# Patient Record
Sex: MALE | Race: WHITE | Employment: FULL TIME | URBAN - METROPOLITAN AREA
[De-identification: names, ages, dates, MRNs, and addresses within clinical notes are randomized per-mention and may not be internally consistent; named-entity substitution may affect disease eponyms.]

---

## 2019-08-01 ENCOUNTER — OFFICE VISIT (OUTPATIENT)
Dept: FAMILY MEDICINE CLINIC | Age: 29
End: 2019-08-01

## 2019-08-01 VITALS
OXYGEN SATURATION: 100 % | HEIGHT: 66 IN | BODY MASS INDEX: 29.25 KG/M2 | RESPIRATION RATE: 17 BRPM | TEMPERATURE: 97.9 F | DIASTOLIC BLOOD PRESSURE: 77 MMHG | HEART RATE: 80 BPM | SYSTOLIC BLOOD PRESSURE: 124 MMHG | WEIGHT: 182 LBS

## 2019-08-01 DIAGNOSIS — R03.0 ELEVATED BLOOD PRESSURE READING: Primary | ICD-10-CM

## 2019-08-01 NOTE — PROGRESS NOTES
Chief Complaint   Patient presents with    Establish Care    Hypertension     Home readings 150/100, 140/90    Labs     NON Fasting     1. Have you been to the ER, urgent care clinic since your last visit? Hospitalized since your last visit? No    2. Have you seen or consulted any other health care providers outside of the [address] [place] [address] since your last visit? Include any pap smears or colon screening. No      Chief Complaint   Patient presents with    Establish Care    Hypertension     Home readings 150/100, 140/90    Labs     NON Fasting     He is a 30 y.o. male who presents for evalution. Reviewed PmHx, RxHx, FmHx, SocHx, AllgHx and updated and dated in the chart. There are no active problems to display for this patient. Review of Systems - negative except as listed above in the HPI    Objective:     Vitals:    08/01/19 0956   BP: 124/77   Pulse: 80   Resp: 17   Temp: 97.9 °F (36.6 °C)   TempSrc: Oral   SpO2: 100%   Weight: 182 lb (82.6 kg)   Height: 5' 6.25\" (1.683 m)     Physical Examination: General appearance - alert, well appearing, and in no distress  Neck - supple, no significant adenopathy  Chest - clear to auscultation, no wheezes, rales or rhonchi, symmetric air entry  Heart - normal rate, regular rhythm, normal S1, S2, no murmurs, rubs, clicks or gallops    Assessment/ Plan:   Diagnoses and all orders for this visit:    1. Elevated blood pressure reading  -nl here  -cont watch at home           I have discussed the diagnosis with the patient and the intended plan as seen in the above orders. The patient understands and agrees with the plan. The patient has received an after-visit summary and questions were answered concerning future plans. Medication Side Effects and Warnings were discussed with patient  Patient Labs were reviewed and or requested:  Patient Past Records were reviewed and or requested    Lisle Schwab, M.D.     There are no Patient Instructions on file for this visit.

## 2021-01-05 DIAGNOSIS — I10 ESSENTIAL (PRIMARY) HYPERTENSION: ICD-10-CM

## 2021-01-06 RX ORDER — AMLODIPINE BESYLATE 5 MG/1
TABLET ORAL
Qty: 30 TAB | Refills: 0 | Status: SHIPPED | OUTPATIENT
Start: 2021-01-06 | End: 2021-01-19 | Stop reason: SDUPTHER

## 2021-01-06 NOTE — TELEPHONE ENCOUNTER
Due for an appointment. Sent in 30 days. Please schedule. Can be virtual if he has a way to check his BP at home on screen during meeting. If not, in person please.

## 2021-01-06 NOTE — TELEPHONE ENCOUNTER
Called and made patient an appt for 1/19/2021 at 10:30 am and told him it will be 100.00 down payment.

## 2021-01-14 PROBLEM — F41.1 ANXIETY, GENERALIZED: Status: ACTIVE | Noted: 2021-01-14

## 2021-01-14 PROBLEM — I10 ESSENTIAL HYPERTENSION: Status: ACTIVE | Noted: 2021-01-14

## 2021-01-14 RX ORDER — BUSPIRONE HYDROCHLORIDE 5 MG/1
5 TABLET ORAL
COMMUNITY
End: 2021-01-19

## 2021-01-19 ENCOUNTER — OFFICE VISIT (OUTPATIENT)
Dept: PRIMARY CARE CLINIC | Age: 31
End: 2021-01-19

## 2021-01-19 VITALS
SYSTOLIC BLOOD PRESSURE: 127 MMHG | TEMPERATURE: 97.4 F | BODY MASS INDEX: 29.09 KG/M2 | HEIGHT: 66 IN | OXYGEN SATURATION: 97 % | RESPIRATION RATE: 18 BRPM | WEIGHT: 181 LBS | DIASTOLIC BLOOD PRESSURE: 85 MMHG | HEART RATE: 61 BPM

## 2021-01-19 DIAGNOSIS — I10 ESSENTIAL (PRIMARY) HYPERTENSION: ICD-10-CM

## 2021-01-19 PROCEDURE — 99212 OFFICE O/P EST SF 10 MIN: CPT | Performed by: NURSE PRACTITIONER

## 2021-01-19 RX ORDER — AMLODIPINE BESYLATE 5 MG/1
5 TABLET ORAL DAILY
Qty: 90 TAB | Refills: 3 | Status: SHIPPED | OUTPATIENT
Start: 2021-01-19 | End: 2022-02-09 | Stop reason: SDUPTHER

## 2021-01-19 RX ORDER — THERA TABS 400 MCG
1 TAB ORAL DAILY
COMMUNITY

## 2021-01-19 NOTE — PROGRESS NOTES
HISTORY OF PRESENT ILLNESS  Mine Linda is a 35 y.o. male presents for hypertension. Patient reported that his BP has been well controlled on amlodipine. Denies headaches, dizziness or vision changes. Vitals:    01/19/21 1047   BP: 127/85   Pulse: 61   Resp: 18   Temp: 97.4 °F (36.3 °C)   TempSrc: Temporal   SpO2: 97%   Weight: 181 lb (82.1 kg)   Height: 5' 6\" (1.676 m)     Patient Active Problem List   Diagnosis Code    Essential hypertension I10    Anxiety, generalized F41.1     Patient Active Problem List    Diagnosis Date Noted    Essential hypertension 01/14/2021    Anxiety, generalized 01/14/2021     Current Outpatient Medications   Medication Sig Dispense Refill    therapeutic multivitamin (THERAGRAN) tablet Take 1 Tab by mouth daily.  amLODIPine (NORVASC) 5 mg tablet Take 1 Tab by mouth daily. 80 Tab 3     No Known Allergies  Past Medical History:   Diagnosis Date    Allergies     Anxiety, generalized 1/14/2021    Essential hypertension 1/14/2021     Past Surgical History:   Procedure Laterality Date    HX HERNIA REPAIR      HX HERNIA REPAIR       Family History   Problem Relation Age of Onset    Hypertension Mother     Hypertension Father     Elevated Lipids Father     Hypertension Brother      Social History     Tobacco Use    Smoking status: Never Smoker    Smokeless tobacco: Never Used   Substance Use Topics    Alcohol use: Not Currently     Comment: occasional           Review of Systems   Constitutional: Negative for malaise/fatigue and weight loss. Eyes: Negative for blurred vision and double vision. Respiratory: Negative for shortness of breath. Cardiovascular: Negative for chest pain, palpitations and leg swelling. Musculoskeletal: Negative for joint pain and myalgias. Neurological: Negative for dizziness, tingling and headaches. Physical Exam  Constitutional:       Appearance: Normal appearance.    Eyes:      Conjunctiva/sclera: Conjunctivae normal. Pupils: Pupils are equal, round, and reactive to light. Neck:      Musculoskeletal: Normal range of motion. Cardiovascular:      Rate and Rhythm: Normal rate and regular rhythm. Pulses: Normal pulses. Pulmonary:      Effort: Pulmonary effort is normal.      Breath sounds: Normal breath sounds. Skin:     General: Skin is warm and dry. Neurological:      Mental Status: He is alert and oriented to person, place, and time. Psychiatric:         Mood and Affect: Mood normal.         Behavior: Behavior normal.           ASSESSMENT and PLAN  Diagnoses and all orders for this visit:    1. Essential (primary) hypertension  Comments:  well controlled on amlodipine. Orders:  -     amLODIPine (NORVASC) 5 mg tablet;  Take 1 Tab by mouth daily.  -     METABOLIC PANEL, BASIC         Angela Angela, CARMEN

## 2021-01-20 LAB
BUN SERPL-MCNC: 16 MG/DL (ref 6–20)
BUN/CREAT SERPL: 15 (ref 9–20)
CALCIUM SERPL-MCNC: 9.9 MG/DL (ref 8.7–10.2)
CHLORIDE SERPL-SCNC: 104 MMOL/L (ref 96–106)
CO2 SERPL-SCNC: 26 MMOL/L (ref 20–29)
CREAT SERPL-MCNC: 1.06 MG/DL (ref 0.76–1.27)
GLUCOSE SERPL-MCNC: 93 MG/DL (ref 65–99)
POTASSIUM SERPL-SCNC: 4.4 MMOL/L (ref 3.5–5.2)
SODIUM SERPL-SCNC: 143 MMOL/L (ref 134–144)

## 2021-06-29 ENCOUNTER — VIRTUAL VISIT (OUTPATIENT)
Dept: PRIMARY CARE CLINIC | Age: 31
End: 2021-06-29

## 2021-06-29 DIAGNOSIS — Z13.31 DEPRESSION SCREEN: ICD-10-CM

## 2021-06-29 DIAGNOSIS — J06.9 URI WITH COUGH AND CONGESTION: Primary | ICD-10-CM

## 2021-06-29 PROCEDURE — 99215 OFFICE O/P EST HI 40 MIN: CPT | Performed by: FAMILY MEDICINE

## 2021-06-29 PROCEDURE — 96160 PT-FOCUSED HLTH RISK ASSMT: CPT | Performed by: FAMILY MEDICINE

## 2021-06-29 NOTE — PROGRESS NOTES
HPI     Chief Complaint   Patient presents with    Nasal Congestion     Congestion with dry cough, runny nose. Temp flat. Started about one week ago. HPI:  Carolanne Goldberg is a 28 y.o. male who is having a cough with clear phlegm and runny nose for one week. Has not had COVID vaccination. Denies fever. Asspciated with congestion. Initially thought allergies. Taking Zyrtec, which is his go to, but only when he remembers it. Denies any shortness of breath or wheezing. Review of Systems   Constitutional: Negative for chills and fever. HENT: Positive for congestion. Negative for ear pain. Respiratory: Positive for cough. Negative for shortness of breath. Reviewed PmHx, FmHx, SocHx as well as meds and allergies, updated and dated in the chart. Objective     Vital Signs: (As obtained by patient/caregiver at home)  There were no vitals taken for this visit.    Patient-Reported Vitals 6/29/2021   Patient-Reported Weight 170lb   Patient-Reported Temperature 98.4       [INSTRUCTIONS:  \"[x]\" Indicates a positive item  \"[]\" Indicates a negative item  -- DELETE ALL ITEMS NOT EXAMINED]    Constitutional: [x] Appears well-developed and well-nourished [x] No apparent distress      [] Abnormal -     Mental status: [x] Alert and awake  [x] Oriented to person/place/time [x] Able to follow commands    [] Abnormal -     Eyes:   EOM    [x]  Normal    [] Abnormal -   Sclera  [x]  Normal    [] Abnormal -          Discharge [x]  None visible   [] Abnormal -     HENT: [x] Normocephalic, atraumatic  [] Abnormal -   [x] Mouth/Throat: Mucous membranes are moist    External Ears [x] Normal  [] Abnormal -    Neck: [x] No visualized mass [] Abnormal -     Pulmonary/Chest: [x] Respiratory effort normal   [x] No visualized signs of difficulty breathing or respiratory distress        [] Abnormal -      Musculoskeletal:   [] Normal gait with no signs of ataxia         [x] Normal range of motion of neck        [] Abnormal - Neurological:        [x] No Facial Asymmetry (Cranial nerve 7 motor function) (limited exam due to video visit)          [x] No gaze palsy        [] Abnormal -          Skin:        [x] No significant exanthematous lesions or discoloration noted on facial skin         [] Abnormal -            Psychiatric:       [x] Normal Affect [] Abnormal -        [x] No Hallucinations    Other pertinent observable physical exam findings:- Dry cough during visit. Talks in full sentences. Did not appear to be any any acute distress. Assessment and Plan     Patient with URI and cough. Afebrile and clear phlegm. Given hx and duration, likely viral. Encourage supportive care. Discussed warning signs. COVID-19 cannot be ruled out. Offered testing at clinic, declined due to schedule. Is going to local pharmacy. Isolate in meantime. Diagnoses and all orders for this visit:    1. URI with cough and congestion    2. Depression screen  -     TN PT-FOCUSED HLTH RISK ASSMT SCORE DOC STND INSTRM    Other orders  -     dextromethorphan-guaiFENesin 5-100 mg/5 mL liqd; Take 5 mL by mouth every six (6) hours as needed for Cough or Congestion. Follow-up and Dispositions    · Return if symptoms worsen or fail to improve. aMya Siddiqi is being evaluated by a Virtual Visit (video visit) encounter to address concerns as mentioned above. A caregiver was present when appropriate. Due to this being a TeleHealth encounter (During XHQCJ-94 public health emergency), evaluation of the following organ systems was limited: Vitals/Constitutional/EENT/Resp/CV/GI//MS/Neuro/Skin/Heme-Lymph-Imm.   Pursuant to the emergency declaration under the [facility], [address] [place] [address] authority and the [employer] and [employer] Act, this Virtual Visit was conducted with patient's (and/or legal guardian's) consent, to reduce the patient's risk of exposure to COVID-19 and provide necessary medical care. The patient (and/or legal guardian) has also been advised to contact this office for worsening conditions or problems, and seek emergency medical treatment and/or call 911 if deemed necessary. Patient identification was verified at the start of the visit: YES    Services were provided through a video synchronous discussion virtually to substitute for in-person clinic visit. Patient was located at home and provider was located in office or at home.        Ismael Portillo MD  [address] [place] [address]

## 2022-02-09 ENCOUNTER — TELEPHONE (OUTPATIENT)
Dept: PRIMARY CARE CLINIC | Age: 32
End: 2022-02-09

## 2022-02-09 DIAGNOSIS — I10 ESSENTIAL (PRIMARY) HYPERTENSION: ICD-10-CM

## 2022-02-09 RX ORDER — AMLODIPINE BESYLATE 5 MG/1
5 TABLET ORAL DAILY
Qty: 90 TABLET | Refills: 0 | Status: SHIPPED | OUTPATIENT
Start: 2022-02-09 | End: 2022-03-15 | Stop reason: SDUPTHER

## 2022-03-15 ENCOUNTER — OFFICE VISIT (OUTPATIENT)
Dept: PRIMARY CARE CLINIC | Age: 32
End: 2022-03-15

## 2022-03-15 VITALS
BODY MASS INDEX: 27 KG/M2 | HEART RATE: 72 BPM | TEMPERATURE: 97.7 F | SYSTOLIC BLOOD PRESSURE: 122 MMHG | DIASTOLIC BLOOD PRESSURE: 73 MMHG | RESPIRATION RATE: 18 BRPM | WEIGHT: 168 LBS | HEIGHT: 66 IN

## 2022-03-15 DIAGNOSIS — I10 ESSENTIAL (PRIMARY) HYPERTENSION: ICD-10-CM

## 2022-03-15 PROCEDURE — 99213 OFFICE O/P EST LOW 20 MIN: CPT | Performed by: NURSE PRACTITIONER

## 2022-03-15 RX ORDER — AMLODIPINE BESYLATE 5 MG/1
5 TABLET ORAL DAILY
Qty: 90 TABLET | Refills: 3 | Status: SHIPPED | OUTPATIENT
Start: 2022-03-15

## 2022-03-15 NOTE — PROGRESS NOTES
Chief Complaint   Patient presents with    Follow Up Chronic Condition     pt is asking for refills on amlodipine          1. Have you been to the ER, urgent care clinic since your last visit? Hospitalized since your last visit?no    2. Have you seen or consulted any other health care providers outside of the [address] [place] [address] since your last visit? Include any pap smears or colon screening.  no    Visit Vitals  /73 (BP 1 Location: Right arm, BP Patient Position: At rest, BP Cuff Size: Adult)   Pulse 72   Temp 97.7 °F (36.5 °C) (Temporal)   Resp 18   Ht 5' 6\" (1.676 m)   Wt 168 lb (76.2 kg)   BMI 27.12 kg/m²

## 2022-03-15 NOTE — PROGRESS NOTES
HISTORY OF PRESENT ILLNESS  Gerald Youssef is a 28 y.o. male presents for hypertension. Patient reported that his BP has been well controlled on amlodipine. Denies headaches, dizziness or vision changes. Would like routine labs to check wellness. Self employed. Vitals:    03/15/22 1145   BP: 122/73   Pulse: 72   Resp: 18   Temp: 97.7 °F (36.5 °C)   TempSrc: Temporal   Weight: 168 lb (76.2 kg)   Height: 5' 6\" (1.676 m)     Patient Active Problem List   Diagnosis Code    Essential hypertension I10    Anxiety, generalized F41.1     Patient Active Problem List    Diagnosis Date Noted    Essential hypertension 01/14/2021    Anxiety, generalized 01/14/2021     Current Outpatient Medications   Medication Sig Dispense Refill    amLODIPine (NORVASC) 5 mg tablet Take 1 Tablet by mouth daily. 90 Tablet 3    therapeutic multivitamin (THERAGRAN) tablet Take 1 Tab by mouth daily. No Known Allergies  Past Medical History:   Diagnosis Date    Allergies     Anxiety, generalized 1/14/2021    Essential hypertension 1/14/2021     Past Surgical History:   Procedure Laterality Date    HX HERNIA REPAIR      HX HERNIA REPAIR       Family History   Problem Relation Age of Onset    Hypertension Mother     Hypertension Father     Elevated Lipids Father     Hypertension Brother      Social History     Tobacco Use    Smoking status: Never Smoker    Smokeless tobacco: Never Used   Substance Use Topics    Alcohol use: Not Currently     Comment: occasional           Review of Systems   Constitutional: Negative for malaise/fatigue and weight loss. Eyes: Negative for blurred vision and double vision. Respiratory: Negative for shortness of breath. Cardiovascular: Negative for chest pain, palpitations and leg swelling. Musculoskeletal: Negative for joint pain and myalgias. Neurological: Negative for dizziness, tingling and headaches.        Physical Exam  Constitutional:       Appearance: Normal appearance. Eyes:      Conjunctiva/sclera: Conjunctivae normal.      Pupils: Pupils are equal, round, and reactive to light. Cardiovascular:      Rate and Rhythm: Normal rate and regular rhythm. Pulses: Normal pulses. Pulmonary:      Effort: Pulmonary effort is normal.      Breath sounds: Normal breath sounds. Musculoskeletal:      Cervical back: Normal range of motion. Skin:     General: Skin is warm and dry. Neurological:      Mental Status: He is alert and oriented to person, place, and time. Psychiatric:         Mood and Affect: Mood normal.         Behavior: Behavior normal.           ASSESSMENT and PLAN  Diagnoses and all orders for this visit:    1. Essential (primary) hypertension  Comments:  well controlled on amlodipine. Orders:  -     CBC W/O DIFF  -     METABOLIC PANEL, COMPREHENSIVE  -     LIPID PANEL  -     amLODIPine (NORVASC) 5 mg tablet; Take 1 Tablet by mouth daily.          Tyron Matias NP

## 2022-03-16 LAB
ALBUMIN SERPL-MCNC: 4.8 G/DL (ref 4.1–5.2)
ALBUMIN/GLOB SERPL: 1.8 {RATIO} (ref 1.2–2.2)
ALP SERPL-CCNC: 79 IU/L (ref 44–121)
ALT SERPL-CCNC: 31 IU/L (ref 0–44)
AST SERPL-CCNC: 25 IU/L (ref 0–40)
BILIRUB SERPL-MCNC: 0.7 MG/DL (ref 0–1.2)
BUN SERPL-MCNC: 16 MG/DL (ref 6–20)
BUN/CREAT SERPL: 15 (ref 9–20)
CALCIUM SERPL-MCNC: 10 MG/DL (ref 8.7–10.2)
CHLORIDE SERPL-SCNC: 100 MMOL/L (ref 96–106)
CHOLEST SERPL-MCNC: 232 MG/DL (ref 100–199)
CO2 SERPL-SCNC: 23 MMOL/L (ref 20–29)
CREAT SERPL-MCNC: 1.1 MG/DL (ref 0.76–1.27)
EGFR: 93 ML/MIN/1.73
ERYTHROCYTE [DISTWIDTH] IN BLOOD BY AUTOMATED COUNT: 13 % (ref 11.6–15.4)
GLOBULIN SER CALC-MCNC: 2.7 G/DL (ref 1.5–4.5)
GLUCOSE SERPL-MCNC: 89 MG/DL (ref 65–99)
HCT VFR BLD AUTO: 49.3 % (ref 37.5–51)
HDLC SERPL-MCNC: 59 MG/DL
HGB BLD-MCNC: 17.1 G/DL (ref 13–17.7)
LDLC SERPL CALC-MCNC: 147 MG/DL (ref 0–99)
MCH RBC QN AUTO: 30.9 PG (ref 26.6–33)
MCHC RBC AUTO-ENTMCNC: 34.7 G/DL (ref 31.5–35.7)
MCV RBC AUTO: 89 FL (ref 79–97)
PLATELET # BLD AUTO: 274 X10E3/UL (ref 150–450)
POTASSIUM SERPL-SCNC: 4.2 MMOL/L (ref 3.5–5.2)
PROT SERPL-MCNC: 7.5 G/DL (ref 6–8.5)
RBC # BLD AUTO: 5.53 X10E6/UL (ref 4.14–5.8)
SODIUM SERPL-SCNC: 141 MMOL/L (ref 134–144)
TRIGL SERPL-MCNC: 144 MG/DL (ref 0–149)
VLDLC SERPL CALC-MCNC: 26 MG/DL (ref 5–40)
WBC # BLD AUTO: 6.1 X10E3/UL (ref 3.4–10.8)

## 2022-03-18 PROBLEM — I10 ESSENTIAL HYPERTENSION: Status: ACTIVE | Noted: 2021-01-14

## 2022-03-19 PROBLEM — F41.1 ANXIETY, GENERALIZED: Status: ACTIVE | Noted: 2021-01-14

## 2023-05-22 DIAGNOSIS — I10 ESSENTIAL (PRIMARY) HYPERTENSION: ICD-10-CM

## 2023-05-22 RX ORDER — AMLODIPINE BESYLATE 5 MG/1
5 TABLET ORAL DAILY
COMMUNITY
Start: 2022-03-15 | End: 2023-05-22

## 2023-05-22 RX ORDER — AMLODIPINE BESYLATE 5 MG/1
5 TABLET ORAL DAILY
Qty: 90 TABLET | Refills: 0 | Status: SHIPPED | OUTPATIENT
Start: 2023-05-22

## 2023-05-23 ENCOUNTER — TELEPHONE (OUTPATIENT)
Dept: PRIMARY CARE CLINIC | Facility: CLINIC | Age: 33
End: 2023-05-23

## 2023-08-22 ENCOUNTER — TELEPHONE (OUTPATIENT)
Dept: PRIMARY CARE CLINIC | Facility: CLINIC | Age: 33
End: 2023-08-22

## 2023-08-22 DIAGNOSIS — I10 ESSENTIAL (PRIMARY) HYPERTENSION: ICD-10-CM

## 2023-08-22 RX ORDER — AMLODIPINE BESYLATE 5 MG/1
5 TABLET ORAL DAILY
Qty: 90 TABLET | Refills: 0 | Status: SHIPPED | OUTPATIENT
Start: 2023-08-22

## 2023-08-22 NOTE — TELEPHONE ENCOUNTER
Pt is completely of of medication needs rx refill on amlodipine 5mg.  [address]

## 2023-11-21 DIAGNOSIS — I10 ESSENTIAL (PRIMARY) HYPERTENSION: ICD-10-CM

## 2023-11-21 RX ORDER — AMLODIPINE BESYLATE 5 MG/1
5 TABLET ORAL DAILY
Qty: 30 TABLET | Refills: 0 | Status: SHIPPED | OUTPATIENT
Start: 2023-11-21

## 2023-11-21 NOTE — TELEPHONE ENCOUNTER
----- Message from Mikala Capellan sent at 11/21/2023  1:15 PM EST -----  Subject: Refill Request    QUESTIONS  Name of Medication? amLODIPine (NORVASC) 5 MG tablet  Patient-reported dosage and instructions? 5 MG 1 x a day  How many days do you have left? 0  Preferred Pharmacy? [address] [facility] phone number (if available)? [phone]  Additional Information for Provider? has made a f/u appt on 11-30 @ 11:30  ---------------------------------------------------------------------------  --------------  Mary Marine Fanta  What is the best way for the office to contact you? OK to leave message on   voicemail  Preferred Call Back Phone Number? [phone]  ---------------------------------------------------------------------------  --------------  SCRIPT ANSWERS  Relationship to Patient?  Self

## 2023-11-30 ENCOUNTER — TELEMEDICINE (OUTPATIENT)
Dept: PRIMARY CARE CLINIC | Facility: CLINIC | Age: 33
End: 2023-11-30

## 2023-11-30 DIAGNOSIS — I10 ESSENTIAL (PRIMARY) HYPERTENSION: ICD-10-CM

## 2023-11-30 PROCEDURE — 99213 OFFICE O/P EST LOW 20 MIN: CPT | Performed by: NURSE PRACTITIONER

## 2023-11-30 RX ORDER — AMLODIPINE BESYLATE 10 MG/1
10 TABLET ORAL DAILY
Qty: 90 TABLET | Refills: 3 | Status: SHIPPED | OUTPATIENT
Start: 2023-11-30

## 2023-11-30 ASSESSMENT — PATIENT HEALTH QUESTIONNAIRE - PHQ9
SUM OF ALL RESPONSES TO PHQ QUESTIONS 1-9: 0
SUM OF ALL RESPONSES TO PHQ QUESTIONS 1-9: 0
1. LITTLE INTEREST OR PLEASURE IN DOING THINGS: 0
SUM OF ALL RESPONSES TO PHQ9 QUESTIONS 1 & 2: 0
2. FEELING DOWN, DEPRESSED OR HOPELESS: 0
SUM OF ALL RESPONSES TO PHQ QUESTIONS 1-9: 0
SUM OF ALL RESPONSES TO PHQ QUESTIONS 1-9: 0

## 2023-11-30 NOTE — PROGRESS NOTES
Darius Chew is a 29 y.o. male who was seen via telemedicine today 11/30/2023). Assessment & Plan:   Below is the assessment and plan developed based on review of pertinent history, physical exam, labs, studies, and medications. 1. Essential (primary) hypertension  -     amLODIPine (NORVASC) 10 MG tablet; Take 1 tablet by mouth daily, Disp-90 tablet, R-3Normal      No follow-ups on file. Subjective:   Fanny Moody was seen today for Hypertension     Hypertension: Patients hypertension is well controlled on regimen of amlodipine. Denies headaches, blurred vision or dizziness. Notes that some times he is having readings closer to 130/90. Notes that he does not eat the healthiest food. Patient was at [facility] recently for a panic attack, notes that he had lab work checked at that time that was all normal.  EKG was normal.         Review of Systems   Eyes:  Negative for visual disturbance. Cardiovascular:  Negative for chest pain. Neurological:  Negative for dizziness and headaches. Objective: There were no vitals filed for this visit. There is no height or weight on file to calculate BMI. Physical Exam  Constitutional:       Appearance: Normal appearance. HENT:      Head: Normocephalic. Eyes:      Conjunctiva/sclera: Conjunctivae normal.   Pulmonary:      Effort: Pulmonary effort is normal.   Skin:     Coloration: Skin is not pale. Neurological:      Mental Status: He is alert and oriented to person, place, and time. Psychiatric:         Mood and Affect: Mood normal.         Behavior: Behavior normal.          No Known Allergies    Current Outpatient Medications   Medication Sig Dispense Refill    amLODIPine (NORVASC) 10 MG tablet Take 1 tablet by mouth daily 90 tablet 3     No current facility-administered medications for this visit.        Reviewed and updated this visit:  Tobacco  Allergies  Meds  Problems  Med Hx  Surg Hx  Soc Hx  Fam Hx     Darius Chew, was

## 2024-12-05 DIAGNOSIS — I10 ESSENTIAL (PRIMARY) HYPERTENSION: ICD-10-CM

## 2024-12-05 RX ORDER — AMLODIPINE BESYLATE 10 MG/1
10 TABLET ORAL DAILY
Qty: 90 TABLET | Refills: 0 | Status: SHIPPED | OUTPATIENT
Start: 2024-12-05 | End: 2024-12-06 | Stop reason: SDUPTHER

## 2024-12-06 ENCOUNTER — TELEPHONE (OUTPATIENT)
Dept: PRIMARY CARE CLINIC | Facility: CLINIC | Age: 34
End: 2024-12-06

## 2024-12-06 DIAGNOSIS — I10 ESSENTIAL (PRIMARY) HYPERTENSION: ICD-10-CM

## 2024-12-06 RX ORDER — AMLODIPINE BESYLATE 10 MG/1
10 TABLET ORAL DAILY
Qty: 90 TABLET | Refills: 0 | Status: SHIPPED | OUTPATIENT
Start: 2024-12-06

## 2024-12-06 NOTE — TELEPHONE ENCOUNTER
Patient stated that his Amlodipine was not sent to pharmacy. He would like for it to be sent to [facility] [address] [place] [address]

## 2024-12-12 ENCOUNTER — TELEMEDICINE (OUTPATIENT)
Dept: PRIMARY CARE CLINIC | Facility: CLINIC | Age: 34
End: 2024-12-12

## 2024-12-12 DIAGNOSIS — I10 ESSENTIAL (PRIMARY) HYPERTENSION: Primary | Chronic | ICD-10-CM

## 2024-12-12 DIAGNOSIS — J00 COLD VIRUS: ICD-10-CM

## 2024-12-12 PROCEDURE — 99214 OFFICE O/P EST MOD 30 MIN: CPT | Performed by: NURSE PRACTITIONER

## 2024-12-12 RX ORDER — AMLODIPINE BESYLATE 10 MG/1
10 TABLET ORAL DAILY
Qty: 90 TABLET | Refills: 3 | Status: SHIPPED | OUTPATIENT
Start: 2024-12-12

## 2024-12-12 SDOH — ECONOMIC STABILITY: INCOME INSECURITY: HOW HARD IS IT FOR YOU TO PAY FOR THE VERY BASICS LIKE FOOD, HOUSING, MEDICAL CARE, AND HEATING?: NOT HARD AT ALL

## 2024-12-12 SDOH — ECONOMIC STABILITY: FOOD INSECURITY: WITHIN THE PAST 12 MONTHS, THE FOOD YOU BOUGHT JUST DIDN'T LAST AND YOU DIDN'T HAVE MONEY TO GET MORE.: NEVER TRUE

## 2024-12-12 SDOH — ECONOMIC STABILITY: FOOD INSECURITY: WITHIN THE PAST 12 MONTHS, YOU WORRIED THAT YOUR FOOD WOULD RUN OUT BEFORE YOU GOT MONEY TO BUY MORE.: NEVER TRUE

## 2024-12-12 ASSESSMENT — ENCOUNTER SYMPTOMS: SHORTNESS OF BREATH: 0

## 2024-12-12 ASSESSMENT — PATIENT HEALTH QUESTIONNAIRE - PHQ9
2. FEELING DOWN, DEPRESSED OR HOPELESS: NOT AT ALL
SUM OF ALL RESPONSES TO PHQ QUESTIONS 1-9: 0
1. LITTLE INTEREST OR PLEASURE IN DOING THINGS: NOT AT ALL
SUM OF ALL RESPONSES TO PHQ9 QUESTIONS 1 & 2: 0
SUM OF ALL RESPONSES TO PHQ QUESTIONS 1-9: 0

## 2024-12-12 NOTE — PROGRESS NOTES
\"Have you been to the ER, urgent care clinic since your last visit?  Hospitalized since your last visit?\"    NO    “Have you seen or consulted any other health care providers outside our system since your last visit?”    NO           
Conjunctiva/sclera: Conjunctivae normal.   Pulmonary:      Effort: Pulmonary effort is normal.   Skin:     Coloration: Skin is not pale.   Neurological:      Mental Status: He is alert and oriented to person, place, and time.   Psychiatric:         Mood and Affect: Mood normal.         Behavior: Behavior normal.          No Known Allergies    Current Outpatient Medications   Medication Sig Dispense Refill    amLODIPine (NORVASC) 10 MG tablet Take 1 tablet by mouth daily 90 tablet 3     No current facility-administered medications for this visit.       Reviewed and updated this visit:  Tobacco  Allergies  Meds  Problems  Med Hx  Surg Hx  Soc Hx  Fam Hx     Peter Ade, was evaluated through a synchronous (real-time) audio-video encounter. The patient (or guardian if applicable) is aware that this is a billable service, which includes applicable co-pays. This Virtual Visit was conducted with patient's (and/or legal guardian's) consent. Patient identification was verified, and a caregiver was present when appropriate.   The patient was located at Home: [address] [place] [address] [place] [address]  Provider was located at Home (Appt Dept State): [place]  Confirm you are appropriately licensed, registered, or certified to deliver care in the state where the patient is located as indicated above. If you are not or unsure, please re-schedule the visit: Yes, I confirm.        Services were provided through a video synchronous discussion virtually to substitute for in-person encounter.    --HECTOR Barba NP on 12/12/2024 at 9:52 AM    An electronic signature was used to authenticate this note.